# Patient Record
Sex: FEMALE | Race: WHITE
[De-identification: names, ages, dates, MRNs, and addresses within clinical notes are randomized per-mention and may not be internally consistent; named-entity substitution may affect disease eponyms.]

---

## 2018-09-25 ENCOUNTER — HOSPITAL ENCOUNTER (OUTPATIENT)
Dept: HOSPITAL 19 - MC.RAD | Age: 78
End: 2018-09-25
Attending: NURSE PRACTITIONER
Payer: MEDICARE

## 2018-09-25 DIAGNOSIS — Z12.31: Primary | ICD-10-CM

## 2019-09-27 ENCOUNTER — HOSPITAL ENCOUNTER (OUTPATIENT)
Dept: HOSPITAL 19 - MC.RAD | Age: 79
End: 2019-09-27
Attending: NURSE PRACTITIONER
Payer: MEDICARE

## 2019-09-27 DIAGNOSIS — Z12.31: Primary | ICD-10-CM

## 2020-05-27 ENCOUNTER — HOSPITAL ENCOUNTER (OUTPATIENT)
Dept: HOSPITAL 19 - COL.RAD | Age: 80
End: 2020-05-27
Payer: MEDICARE

## 2020-05-27 DIAGNOSIS — R91.8: ICD-10-CM

## 2020-05-27 DIAGNOSIS — J18.1: ICD-10-CM

## 2020-05-27 DIAGNOSIS — Z01.812: Primary | ICD-10-CM

## 2020-05-27 DIAGNOSIS — R59.0: ICD-10-CM

## 2020-05-30 ENCOUNTER — HOSPITAL ENCOUNTER (EMERGENCY)
Dept: HOSPITAL 19 - COL.ER | Age: 80
Discharge: HOME | End: 2020-05-30
Payer: MEDICARE

## 2020-05-30 VITALS — TEMPERATURE: 98.1 F

## 2020-05-30 VITALS — SYSTOLIC BLOOD PRESSURE: 155 MMHG | HEART RATE: 68 BPM | DIASTOLIC BLOOD PRESSURE: 71 MMHG

## 2020-05-30 VITALS — BODY MASS INDEX: 29.84 KG/M2 | WEIGHT: 168.43 LBS | HEIGHT: 62.99 IN

## 2020-05-30 DIAGNOSIS — J18.9: Primary | ICD-10-CM

## 2020-05-30 DIAGNOSIS — E87.6: ICD-10-CM

## 2020-05-30 DIAGNOSIS — R11.2: ICD-10-CM

## 2020-05-30 DIAGNOSIS — Z79.84: ICD-10-CM

## 2020-05-30 DIAGNOSIS — E11.9: ICD-10-CM

## 2020-05-30 LAB
ALBUMIN SERPL-MCNC: 3.6 GM/DL (ref 3.5–5)
ALP SERPL-CCNC: 169 U/L (ref 50–136)
ALT SERPL-CCNC: 69 U/L (ref 4–34)
ANION GAP SERPL CALC-SCNC: 9 MMOL/L (ref 7–16)
AST SERPL-CCNC: 63 U/L (ref 15–37)
BILIRUB SERPL-MCNC: 0.4 MG/DL (ref 0–1)
BUN SERPL-MCNC: 14 MG/DL (ref 7–17)
CALCIUM SERPL-MCNC: 9.3 MG/DL (ref 8.4–10.2)
CHLORIDE SERPL-SCNC: 100 MMOL/L (ref 98–107)
CO2 SERPL-SCNC: 26 MMOL/L (ref 22–30)
CREAT SERPL-SCNC: 0.6 UMOL/L (ref 0.52–1.25)
ERYTHROCYTE [DISTWIDTH] IN BLOOD BY AUTOMATED COUNT: 15.6 % (ref 11.5–14.5)
GLUCOSE SERPL-MCNC: 185 MG/DL (ref 74–106)
HCT VFR BLD AUTO: 36.9 % (ref 37–47)
HGB BLD-MCNC: 11.8 G/DL (ref 12.5–16)
LYMPHOCYTES NFR BLD MANUAL: 16 % (ref 20–51)
MCH RBC QN AUTO: 27 PG (ref 27–31)
MCHC RBC AUTO-ENTMCNC: 32 G/DL (ref 33–37)
MCV RBC AUTO: 83 FL (ref 80–100)
MONOCYTES NFR BLD: 5 % (ref 1.7–9.3)
MYELOCYTES NFR BLD MANUAL: 1 % (ref 0–0)
NEUTS BAND NFR BLD: 5 % (ref 0–10)
NEUTS SEG NFR BLD MANUAL: 73 % (ref 42–75.2)
PLATELET # BLD AUTO: 488 K/MM3 (ref 130–400)
PLATELET BLD QL SMEAR: (no result)
PMV BLD AUTO: 9.6 FL (ref 7.4–10.4)
POTASSIUM SERPL-SCNC: 3.3 MMOL/L (ref 3.4–5)
PROT SERPL-MCNC: 7.2 GM/DL (ref 6.4–8.2)
RBC # BLD AUTO: 4.45 M/MM3 (ref 4.1–5.3)
SODIUM SERPL-SCNC: 136 MMOL/L (ref 137–145)
TROPONIN I SERPL-MCNC: < 0.012 NG/ML (ref 0–0.04)

## 2020-06-05 ENCOUNTER — HOSPITAL ENCOUNTER (OUTPATIENT)
Dept: HOSPITAL 19 - SDCO | Age: 80
Discharge: HOME | End: 2020-06-05
Attending: INTERNAL MEDICINE
Payer: MEDICARE

## 2020-06-05 VITALS — SYSTOLIC BLOOD PRESSURE: 133 MMHG | HEART RATE: 64 BPM | DIASTOLIC BLOOD PRESSURE: 71 MMHG

## 2020-06-05 VITALS — SYSTOLIC BLOOD PRESSURE: 144 MMHG | TEMPERATURE: 97.5 F | HEART RATE: 68 BPM | DIASTOLIC BLOOD PRESSURE: 70 MMHG

## 2020-06-05 VITALS — SYSTOLIC BLOOD PRESSURE: 108 MMHG | HEART RATE: 59 BPM | DIASTOLIC BLOOD PRESSURE: 91 MMHG

## 2020-06-05 VITALS — SYSTOLIC BLOOD PRESSURE: 118 MMHG | HEART RATE: 77 BPM | TEMPERATURE: 97.5 F | DIASTOLIC BLOOD PRESSURE: 73 MMHG

## 2020-06-05 VITALS — HEIGHT: 63.5 IN | WEIGHT: 168.87 LBS | BODY MASS INDEX: 29.55 KG/M2

## 2020-06-05 VITALS — HEART RATE: 66 BPM | SYSTOLIC BLOOD PRESSURE: 126 MMHG | DIASTOLIC BLOOD PRESSURE: 95 MMHG

## 2020-06-05 DIAGNOSIS — I38: ICD-10-CM

## 2020-06-05 DIAGNOSIS — M06.9: ICD-10-CM

## 2020-06-05 DIAGNOSIS — J42: ICD-10-CM

## 2020-06-05 DIAGNOSIS — Z79.899: ICD-10-CM

## 2020-06-05 DIAGNOSIS — Z20.1: ICD-10-CM

## 2020-06-05 DIAGNOSIS — E03.9: ICD-10-CM

## 2020-06-05 DIAGNOSIS — Z79.84: ICD-10-CM

## 2020-06-05 DIAGNOSIS — E11.9: ICD-10-CM

## 2020-06-05 DIAGNOSIS — J18.1: Primary | ICD-10-CM

## 2020-06-05 DIAGNOSIS — J98.11: ICD-10-CM

## 2020-06-05 DIAGNOSIS — Z77.22: ICD-10-CM

## 2020-06-05 NOTE — NUR
RECEIVED DISCHARGE INSTRUCTIONS AND VERBALIZED UNDERSTANDING.
DISCONTINUED IV AND INT- CATHETER INTACT.
PATIENT GETTING DRESSED

## 2020-06-05 NOTE — NUR
PATIENT STATED SHE STOPPED TAKING MANY OF HER MEDS DUE TO HER BEING SICK AND
NOT ABLE TO KEEP ANYTHING DOWN.
CALL LIGHT IN REACH
DAUGHTER AT BEDSIDE.

## 2020-06-05 NOTE — NUR
TO RM 8 PER CART FROM ENDOSCOPY. ALERT ORIENTED X3, TALKING TO STAFF AND HER
DAUGHTER. NONPRODUCTIVE COUGH. RESPIRATIOINS EVEN AND NONLABORED
RECEIVED WATER.

## 2020-09-30 ENCOUNTER — HOSPITAL ENCOUNTER (OUTPATIENT)
Dept: HOSPITAL 19 - MC.RAD | Age: 80
End: 2020-09-30
Attending: FAMILY MEDICINE
Payer: MEDICARE

## 2020-09-30 DIAGNOSIS — Z12.31: Primary | ICD-10-CM

## 2021-10-01 ENCOUNTER — HOSPITAL ENCOUNTER (OUTPATIENT)
Dept: HOSPITAL 19 - MC.RAD | Age: 81
End: 2021-10-01
Attending: FAMILY MEDICINE
Payer: MEDICARE

## 2021-10-01 DIAGNOSIS — Z12.31: Primary | ICD-10-CM

## 2023-10-24 ENCOUNTER — HOSPITAL ENCOUNTER (OUTPATIENT)
Dept: HOSPITAL 19 - MC.RAD | Age: 83
End: 2023-10-24
Attending: FAMILY MEDICINE
Payer: MEDICARE

## 2023-10-24 DIAGNOSIS — Z12.31: Primary | ICD-10-CM

## 2024-11-01 ENCOUNTER — HOSPITAL ENCOUNTER (OUTPATIENT)
Dept: HOSPITAL 19 - MC.RAD | Age: 84
End: 2024-11-01
Attending: FAMILY MEDICINE
Payer: MEDICARE

## 2024-11-01 DIAGNOSIS — Z12.31: Primary | ICD-10-CM
